# Patient Record
Sex: FEMALE | Race: WHITE | NOT HISPANIC OR LATINO | Employment: FULL TIME | URBAN - METROPOLITAN AREA
[De-identification: names, ages, dates, MRNs, and addresses within clinical notes are randomized per-mention and may not be internally consistent; named-entity substitution may affect disease eponyms.]

---

## 2021-09-08 ENCOUNTER — OFFICE VISIT (OUTPATIENT)
Dept: NEUROSURGERY | Facility: CLINIC | Age: 66
End: 2021-09-08

## 2021-09-08 VITALS
WEIGHT: 147.6 LBS | HEIGHT: 65 IN | BODY MASS INDEX: 24.59 KG/M2 | RESPIRATION RATE: 20 BRPM | SYSTOLIC BLOOD PRESSURE: 132 MMHG | HEART RATE: 96 BPM | DIASTOLIC BLOOD PRESSURE: 78 MMHG

## 2021-09-08 DIAGNOSIS — M54.41 CHRONIC RIGHT-SIDED LOW BACK PAIN WITH RIGHT-SIDED SCIATICA: ICD-10-CM

## 2021-09-08 DIAGNOSIS — M54.17 LUMBOSACRAL RADICULOPATHY AT L5: Primary | ICD-10-CM

## 2021-09-08 DIAGNOSIS — G89.29 CHRONIC RIGHT-SIDED LOW BACK PAIN WITH RIGHT-SIDED SCIATICA: ICD-10-CM

## 2021-09-08 DIAGNOSIS — I10 ESSENTIAL HYPERTENSION: ICD-10-CM

## 2021-09-08 DIAGNOSIS — E78.5 DYSLIPIDEMIA: ICD-10-CM

## 2021-09-08 PROCEDURE — 99204 OFFICE O/P NEW MOD 45 MIN: CPT | Performed by: NEUROLOGICAL SURGERY

## 2021-09-08 RX ORDER — GEMFIBROZIL 600 MG/1
600 TABLET, FILM COATED ORAL 2 TIMES DAILY
COMMUNITY
Start: 2021-08-08

## 2021-09-08 RX ORDER — ALENDRONATE SODIUM 70 MG/1
70 TABLET ORAL WEEKLY
COMMUNITY
Start: 2021-08-09

## 2021-09-08 RX ORDER — GABAPENTIN 300 MG/1
300 CAPSULE ORAL 2 TIMES DAILY
COMMUNITY
Start: 2021-08-08 | End: 2021-10-15

## 2021-09-08 RX ORDER — LEVOTHYROXINE SODIUM 0.1 MG/1
100 TABLET ORAL DAILY
COMMUNITY
Start: 2021-04-21

## 2021-09-08 RX ORDER — ATENOLOL 50 MG/1
50 TABLET ORAL DAILY
COMMUNITY
Start: 2021-07-09

## 2021-09-08 RX ORDER — GABAPENTIN 300 MG/1
300 CAPSULE ORAL 3 TIMES DAILY
Qty: 90 CAPSULE | Refills: 0 | Status: SHIPPED | OUTPATIENT
Start: 2021-09-08 | End: 2021-10-12

## 2021-09-08 NOTE — PROGRESS NOTES
Subjective     Chief Complaint: Back pain, right leg pain    Patient ID: Jen Farias is a 66 y.o. female seen for consultation today at the request of  Hanh Colón MD    History of Present Illness    This is a 66-year-old woman who presents to my office with chief complaints of an approximately 3-month history of low back pain and right leg pain.  She has been undergoing physical therapy and states that this does help somewhat, however her leg pain is constant and is affecting her quality of life.  She is endorsing radiating pain in an L5 distribution on the right side.  She does not have much in the way of medical comorbidities other than some dyslipidemia and hypertension.    The following portions of the patient's history were reviewed and updated as appropriate: allergies, current medications, past family history, past medical history, past social history, past surgical history and problem list.    Family history:   Family History   Problem Relation Age of Onset   • Cancer Mother    • Hypertension Father        Social history:   Social History     Socioeconomic History   • Marital status:      Spouse name: Not on file   • Number of children: Not on file   • Years of education: Not on file   • Highest education level: Not on file   Tobacco Use   • Smoking status: Never Smoker   • Smokeless tobacco: Never Used   Vaping Use   • Vaping Use: Never used   Substance and Sexual Activity   • Alcohol use: Never   • Drug use: Never   • Sexual activity: Defer       Review of Systems   Constitutional: Negative for activity change, appetite change, chills, diaphoresis, fatigue, fever and unexpected weight change.   HENT: Positive for postnasal drip. Negative for congestion, dental problem, drooling, ear discharge, ear pain, facial swelling, hearing loss, mouth sores, nosebleeds, rhinorrhea, sinus pressure, sinus pain, sneezing, sore throat, tinnitus, trouble swallowing and voice change.    Eyes: Negative for  "photophobia, pain, discharge, redness, itching and visual disturbance.   Respiratory: Negative for apnea, cough, choking, chest tightness, shortness of breath, wheezing and stridor.    Cardiovascular: Negative for chest pain, palpitations and leg swelling.   Gastrointestinal: Negative for abdominal distention, abdominal pain, anal bleeding, blood in stool, constipation, diarrhea, nausea, rectal pain and vomiting.   Endocrine: Negative for cold intolerance, heat intolerance, polydipsia, polyphagia and polyuria.   Genitourinary: Negative for decreased urine volume, difficulty urinating, dysuria, enuresis, flank pain, frequency, genital sores, hematuria and urgency.   Musculoskeletal: Positive for back pain. Negative for arthralgias, gait problem, joint swelling, myalgias, neck pain and neck stiffness.   Skin: Negative for color change, pallor, rash and wound.   Allergic/Immunologic: Negative for environmental allergies, food allergies and immunocompromised state.   Neurological: Negative for dizziness, tremors, seizures, syncope, facial asymmetry, speech difficulty, weakness, light-headedness, numbness and headaches.   Hematological: Negative for adenopathy. Does not bruise/bleed easily.   Psychiatric/Behavioral: Negative for agitation, behavioral problems, confusion, decreased concentration, dysphoric mood, hallucinations, self-injury, sleep disturbance and suicidal ideas. The patient is not nervous/anxious and is not hyperactive.        Objective   Blood pressure 132/78, pulse 96, resp. rate 20, height 165.1 cm (65\"), weight 67 kg (147 lb 9.6 oz).  Body mass index is 24.56 kg/m².    Physical Exam  Vitals and nursing note reviewed.   Constitutional:       Appearance: She is well-developed. She is not toxic-appearing.   HENT:      Head: Normocephalic and atraumatic.      Right Ear: Hearing normal.      Left Ear: Hearing normal.      Nose: Nose normal.   Eyes:      General: Lids are normal.      Conjunctiva/sclera: " Conjunctivae normal.      Pupils: Pupils are equal, round, and reactive to light.   Neck:      Vascular: No JVD.   Cardiovascular:      Rate and Rhythm: Normal rate and regular rhythm.      Pulses:           Radial pulses are 2+ on the right side and 2+ on the left side.   Pulmonary:      Effort: Pulmonary effort is normal. No respiratory distress.      Breath sounds: No stridor. No wheezing.   Musculoskeletal:      Cervical back: Normal range of motion.   Skin:     General: Skin is warm and dry.      Findings: No erythema or rash.   Neurological:      Mental Status: She is alert and oriented to person, place, and time.      GCS: GCS eye subscore is 4. GCS verbal subscore is 5. GCS motor subscore is 6.      Cranial Nerves: No cranial nerve deficit.      Motor: No abnormal muscle tone.      Deep Tendon Reflexes: Reflexes are normal and symmetric. Reflexes normal.      Comments: Casual, toe raised, heel raised, tandem gait all performed unremarkably.  Station is intact.    Positive straight leg raise test on the right.   Psychiatric:         Behavior: Behavior normal.         Thought Content: Thought content normal.         Judgment: Judgment normal.         Assessment/Plan     Independent Review of Radiographic Studies:      Available for my review is a MRI of the lumbar spine which was performed on 7/26/2021.  This demonstrates advanced degenerative disc disease at L4-5 and L5-S1.  Lumbar lordosis is decreased.  There are early Modic endplate changes at L4-5.  There are annular tears at L4-5 and L5-S1.  Although the spinal canal is somewhat congenitally narrowed, I do not appreciate any obvious foci of significant central canal stenosis.  At L4-5 there is a lateralizing component of the disc bulge and potentially a mesial facet cyst eccentric to the right side causing significant lateral recess stenosis on the right.  This could potentially be contributing to a right L5 radiculopathy.  I do not appreciate any  significant intraforaminal stenosis at L4-5 or L5-S1.  There is no significant lateral recess stenosis at L5-S1.  Incidental notation is made of several sacral Tarlov cysts which are of no clinical significance.    Medical Decision Making:      This is a 66-year-old woman with right L5 radiculopathy and chronic low back pain.  She is a candidate for a right L4-5 lateral recess decompression.  I did discuss the risks and benefits of this operation with her and I reviewed the pertinent anatomy with the aid of a 3-dimensional model of the spine.  She would like to try some conservative treatment which I think is entirely appropriate at this point given that she does not have any weakness.  I will increase her dose of gabapentin and schedule her for a right-sided L4-5 epidural steroid injection.  I would like to follow-up with her in about 6 weeks.    Diagnoses and all orders for this visit:    1. Lumbosacral radiculopathy at L5 (Primary)  -     gabapentin (NEURONTIN) 300 MG capsule; Take 1 capsule by mouth 3 (Three) Times a Day.  Dispense: 90 capsule; Refill: 0  -     Ambulatory Referral to Pain Management    2. Chronic right-sided low back pain with right-sided sciatica  -     gabapentin (NEURONTIN) 300 MG capsule; Take 1 capsule by mouth 3 (Three) Times a Day.  Dispense: 90 capsule; Refill: 0  -     Ambulatory Referral to Pain Management    3. Dyslipidemia    4. Essential hypertension        No follow-ups on file.           This document signed by EUNICE Wooten MD September 8, 2021 10:13 EDT

## 2021-10-01 NOTE — PROGRESS NOTES
"Chief Complaint: \"The pain in my lower back and right leg has improved.\"          History of Present Illness:   Patient: Ms. Jen Farias, 66 y.o. female   Referring Physician: Dr. EUNICE Wooten  Reason for Referral: Consultation for chronic intractable lower back pain.   Pain History:  Patient reports an approximately 6-month history of progressive lower back and right lower extremity pain, which began without incident.  Pain radiates from the lower back into the right lower extremity with an L5 dermatomal type distribution.  MRI of the lumbar spine on July 26, 2021 revealed advanced degenerative disc disease at L4-L5, L5-S1.  Modic endplate changes at L4-L5 and annular tears at L4-L5 and L5-S1.  At L4-L5 there is a disc bulge lateralizing to the right contributing to significant right lateral recess stenosis and potentially contributing to right L5 radiculopathy. Patient has failed to obtain pain relief with conservative measures including oral analgesics, physical therapy, to name a few. Jen Farias underwent neurosurgical consultation with Dr. EUNICE Wooten on September 8, 2021, and was found to be a potential surgical candidate for lumbar decompression. Pain has progressed in intensity over the past months, although she reports some improvement as of recently.   Pain Description: Constant pain with intermittent exacerbation, described as aching, burning, shooting and throbbing sensation.   Radiation of Pain: The pain radiates from the lumbar region down into the right lower extremity with a predominant L4-L5 dermatomal distribution   Pain intensity today: 2/10  Average pain intensity last week: 2/10  Pain intensity ranges from: 1/10 to 7/10  Aggravating factors: Pain increases with bending, twisting, standing, walking.   Alleviating factors: Pain decreases with sitting down, lying down   Associated Symptoms:   Patient denies numbness or weakness in the lower extremities.   Patient denies any new " bladder or bowel problems.   Patient reports difficulties with her balance but denies recent falls.     Review of previous therapies and additional medical records:  Jen Farias has already failed the following measures, including:   Conservative Measures: Oral analgesics, topical analgesics, ice, heat, physical therapy   Interventional Measures: None  Surgical Measures: No history of previous lumbar spine or hip surgery   Jen Farias underwent neurosurgical consultation with Dr. EUNICE Wooten on September 8, 2021, and was found to be a potential surgical candidate for lumbar decompression.   Jen Farias presents with significant comorbidities including hypertension, hyperlipidemia, osteopenia  In terms of current analgesics, Jen Farias takes: Gabapentin.  Patient also takes Fosamax  I have reviewed [facility] Report #[id number] (gabapentin) consistent with medication reconciliation.  SOAPP: Low Risk     Global Pain Scale 10-12  2021          Pain 8          Feelings 1          Clinical outcomes 3          Activities 4          GPS Total: 16            Review of Diagnostic Studies:    MRI of the lumbar spine without contrast 7/26/2021.  I have reviewed the images with the patient as well as the report. Vertebral body heights and alignment are preserved.  Diffuse spondylosis.  The conus medullaris is seen at L1-L2 and has unremarkable appearance.  Axial imaging:  L1-L2: Disc bulge. No significant canal or foraminal stenosis.  Perineural cyst in the right neuroforamen  L2-L3: Disc bulge. No significant canal or neuroforaminal stenosis  L3-L4: Disc bulge. Facet arthropathy.  No significant canal or foraminal stenosis  L4-L5: Broad-based disc protrusion with extension the right lateral recess.  Facet hypertrophy.  There is a stenosis of the right lateral recess.  No significant canal stenosis.  Mild bilateral foraminal stenosis  L5-S1: Broad-based disc protrusion.  No significant canal stenosis.  Mild foraminal  "stenosis    Review of Systems   HENT: Positive for sinus pressure.    Musculoskeletal: Positive for back pain.   All other systems reviewed and are negative.        Patient Active Problem List   Diagnosis   • Essential hypertension   • Dyslipidemia   • Lumbosacral radiculopathy   • Stenosis of lateral recess of lumbar spine   • Degeneration of lumbar or lumbosacral intervertebral disc       Past Medical History:   Diagnosis Date   • Hypertension    • Low back pain          History reviewed. No pertinent surgical history.      Family History   Problem Relation Age of Onset   • Cancer Mother    • Hypertension Father          Social History     Socioeconomic History   • Marital status:    Tobacco Use   • Smoking status: Never Smoker   • Smokeless tobacco: Never Used   Vaping Use   • Vaping Use: Never used   Substance and Sexual Activity   • Alcohol use: Never   • Drug use: Never   • Sexual activity: Defer          Current Outpatient Medications:   •  alendronate (FOSAMAX) 70 MG tablet, Take 70 mg by mouth 1 (One) Time Per Week., Disp: , Rfl:   •  atenolol (TENORMIN) 50 MG tablet, Take 50 mg by mouth Daily., Disp: , Rfl:   •  Calcium Carbonate (Caltrate 600) 1500 (600 Ca) MG tablet, Take 600 mg by mouth Daily., Disp: , Rfl:   •  gabapentin (NEURONTIN) 300 MG capsule, Take 300 mg by mouth 2 (Two) Times a Day., Disp: , Rfl:   •  gemfibrozil (LOPID) 600 MG tablet, Take 600 mg by mouth 2 (Two) Times a Day., Disp: , Rfl:   •  levothyroxine (SYNTHROID, LEVOTHROID) 100 MCG tablet, Take 100 mcg by mouth Daily., Disp: , Rfl:       Allergies   Allergen Reactions   • Doxycycline Hyclate Itching   • Quinolones Itching         /80   Pulse 67   Ht 165.1 cm (65\")   Wt 67.1 kg (148 lb)   SpO2 98%   BMI 24.63 kg/m²       Physical Exam:  Constitutional: Patient appears well-developed, well-nourished, well-hydrated, appears younger than stated age  HEENT: Head: Normocephalic and atraumatic  Eyes: Conjunctivae and lids " are normal  Pupils: Equal, round, reactive to light  Neck: Trachea normal. Neck supple. No JVD present.   Peripheral vascular exam: Posterior tibialis: right 2+ and left 2+. Dorsalis pedis: right 2+ and left 2+. No edema.   Musculoskeletal   Gait and station: Gait evaluation demonstrated a normal gait. Able to walk on heels, toes, tandem walking   Lumbar spine: Passive and active range of motion are full and without pain. Extension, flexion, lateral flexion, rotation of the lumbar spine did not increase or reproduce pain. Lumbar facet joint loading maneuvers are negative.   Austin test and Gaenslen's test are negative   Piriformis maneuvers are negative   Hip joints: The range of motion of the hip joints is almost full and without pain   Neurological:   Patient is alert and oriented to person, place, and time.   Speech: Normal.   Cortical function: Normal mental status.   Reflex Scores:  Right patellar: 2+  Left patellar: 2+  Right Achilles: 1+  Left Achilles: 1+  Motor strength: 5/5  Motor Tone: Normal   Involuntary movements: None.   Superficial/Primitive Reflexes: Primitive reflexes were absent.   Right Heaton: Absent  Left Heaton: Absent  Right ankle clonus: Absent  Left ankle clonus: Absent   Babinsky: Absent  Long tract signs: Negative. Straight leg raising test: Negative.   Sensory exam: Intact to light touch, intact pain and temperature sensation, intact vibration sensation and normal proprioception.   Coordination: Normal finger to nose and heel to shin. Normal balance and negative Romberg's sign   Skin and subcutaneous tissue: Skin is warm and intact. No rash noted. No cyanosis.   Psychiatric: Judgment and insight: Normal. Recent and remote memory: Intact. Mood and affect: Normal.     ASSESSMENT:   1. Lumbosacral radiculopathy    2. Degeneration of lumbar or lumbosacral intervertebral disc    3. Stenosis of lateral recess of lumbar spine        PLAN/MEDICAL DECISION MAKING:  Patient reports a 6-month  history of progressive lower back and right lower extremity pain, which began without incident. Pain radiates from the lower back into the right lower extremity with an L5 dermatomal distribution. MRI of the lumbar spine on July 26, 2021 revealed advanced degenerative disc disease at L4-L5, L5-S1.  Modic endplate changes at L4-L5 and annular tears at L4-L5 and L5-S1.  At L4-L5 there is a disc bulge lateralizing to the right contributing to significant right lateral recess stenosis and potentially contributing to right L5 radiculopathy. Patient failed to obtain pain relief with conservative measures including oral analgesics, physical therapy, to name a few. Jen Farias underwent neurosurgical consultation with Dr. EUNICE Wooten on September 8, 2021, and was found to be a potential surgical candidate for lumbar decompression.  Patient was referred for interventional pain management measures.  However, she reports significant improvement of her low back and right lower extremity pain for the past weeks.  I have reviewed all available patient's medical records as well as previous therapies as referenced above. I had a lengthy conversation with Ms. Jen Farias regarding her chronic pain condition and potential therapeutic options including risks, benefits, alternative therapies, to name a few. Therefore, I have proposed the following plan:  1. Interventional pain management measures: Follow-up with LENCHO Amin on an as-needed basis.  I [facility] the [facility] If pain increases, we may proceed with diagnostic and therapeutic right L4-L5 transforaminal epidural steroid injection. We may repeat epidural depending on patient's outcome.  Patient will follow-up with Dr. Wooten thereafter for possible L4-L5 lumbar decompression.  2. Diagnostic studies: None indicated at this time.  We may consider EMG/NCV of the lower extremities if pain recurs  3. Pharmacological measures:  Reviewed and discussed; Patient takes gabapentin  4. Long-term rehabilitation efforts:  A. The patient does not have a history of falls. I did complete a risk assessment for falls  B. Continue a comprehensive physical therapy program for core strengthening, gait and balance training and neurodynamics   C. Start an exercise program such as water therapy and swimming  D. Contrast therapy: Apply ice-packs for 15-20 minutes, followed by heating pads for 15-20 minutes to affected area   5. The patient has been instructed to contact my office with any questions or difficulties. The patient understands the plan and agrees to proceed accordingly.      Patient Care Team:  Hanh Colón MD as PCP - General (Internal Medicine)  Hanh Colón MD as Referring Physician (Internal Medicine)     No orders of the defined types were placed in this encounter.        No future appointments.      Jesus Rogers MD     EMR Dragon/Transcription disclaimer:  Much of this encounter note is an electronic transcription of spoken language to printed text. Electronic transcription of spoken language may permit erroneous, or at times, nonsensical words or phrases to be inadvertently transcribed. Although I have reviewed the note for such errors, some may still exist.

## 2021-10-08 PROBLEM — M54.17 LUMBOSACRAL RADICULOPATHY: Status: ACTIVE | Noted: 2021-10-08

## 2021-10-08 PROBLEM — M51.37 DEGENERATION OF LUMBAR OR LUMBOSACRAL INTERVERTEBRAL DISC: Status: ACTIVE | Noted: 2021-10-08

## 2021-10-08 PROBLEM — M51.379 DEGENERATION OF LUMBAR OR LUMBOSACRAL INTERVERTEBRAL DISC: Status: ACTIVE | Noted: 2021-10-08

## 2021-10-08 PROBLEM — M48.061 STENOSIS OF LATERAL RECESS OF LUMBAR SPINE: Status: ACTIVE | Noted: 2021-10-08

## 2021-10-12 ENCOUNTER — OFFICE VISIT (OUTPATIENT)
Dept: PAIN MEDICINE | Facility: CLINIC | Age: 66
End: 2021-10-12

## 2021-10-12 VITALS
SYSTOLIC BLOOD PRESSURE: 134 MMHG | HEART RATE: 67 BPM | WEIGHT: 148 LBS | HEIGHT: 65 IN | OXYGEN SATURATION: 98 % | BODY MASS INDEX: 24.66 KG/M2 | DIASTOLIC BLOOD PRESSURE: 80 MMHG

## 2021-10-12 DIAGNOSIS — M48.061 STENOSIS OF LATERAL RECESS OF LUMBAR SPINE: ICD-10-CM

## 2021-10-12 DIAGNOSIS — M54.17 LUMBOSACRAL RADICULOPATHY: ICD-10-CM

## 2021-10-12 DIAGNOSIS — M51.37 DEGENERATION OF LUMBAR OR LUMBOSACRAL INTERVERTEBRAL DISC: ICD-10-CM

## 2021-10-12 PROCEDURE — 99204 OFFICE O/P NEW MOD 45 MIN: CPT | Performed by: ANESTHESIOLOGY

## 2021-10-15 RX ORDER — GABAPENTIN 300 MG/1
CAPSULE ORAL
Qty: 90 CAPSULE | Refills: 1 | Status: SHIPPED | OUTPATIENT
Start: 2021-10-15 | End: 2022-02-22

## 2021-10-15 NOTE — TELEPHONE ENCOUNTER
Provider:  Elgin  Caller:  Automated refill request  Surgery:  NA  Surgery Date: NA   Last visit:  09/08/2021  Next visit: NA    Reason for call:    Automated refill request for Gabapentin.         Requested Prescriptions     Pending Prescriptions Disp Refills   • gabapentin (NEURONTIN) 300 MG capsule [Pharmacy Med Name: GABAPENTIN 300MG CAPSULES] 90 capsule      Sig: TAKE 1 CAPSULE BY MOUTH THREE TIMES DAILY     NAV:    07/02/2021 Gabapentin 300MG 1957 60 30 [facility] 1  08/08/2021 Gabapentin 300MG 1957 60 30 BARRINGTON VARGAS [facility] 1  09/08/2021 Gabapentin 300MG 1957 90 30 ELGIN [facility] 1

## 2022-02-22 NOTE — TELEPHONE ENCOUNTER
Provider:  Je  Caller: patient  Time of call:     Phone #:[phone]    Surgery:  no  Surgery Date:    Last visit:   09/08/21  Next visit:     NAV:   07/02/2021 Gabapentin 300MG 1957 60 30 Hanh Colón [facility] 1  08/08/2021 Gabapentin 300MG 1957 60 30 Hanh Colón [facility] 1  09/08/2021 Gabapentin 300MG 1957 90 30 Sunny Wooten [employer] [facility] 1  10/15/2021 Gabapentin 300MG 1957 90 30 [place], [place] [employer] [facility] 1  12/05/2021 Gabapentin 300MG 1957 90 30 [place], [place] [employer] [facility] 1      Reason for call:     Patient requests refill on Gabapentin.

## 2022-02-23 RX ORDER — GABAPENTIN 300 MG/1
CAPSULE ORAL
Qty: 90 CAPSULE | Refills: 1 | Status: SHIPPED | OUTPATIENT
Start: 2022-02-23 | End: 2022-06-06

## 2022-05-22 PROCEDURE — U0004 COV-19 TEST NON-CDC HGH THRU: HCPCS | Performed by: NURSE PRACTITIONER

## 2022-05-24 ENCOUNTER — TELEPHONE (OUTPATIENT)
Dept: URGENT CARE | Facility: CLINIC | Age: 67
End: 2022-05-24

## 2022-06-05 DIAGNOSIS — M54.17 LUMBOSACRAL RADICULOPATHY: Primary | ICD-10-CM

## 2022-06-06 RX ORDER — GABAPENTIN 300 MG/1
CAPSULE ORAL
Qty: 90 CAPSULE | Refills: 0 | Status: SHIPPED | OUTPATIENT
Start: 2022-06-06 | End: 2022-07-22 | Stop reason: SDUPTHER

## 2022-06-06 NOTE — TELEPHONE ENCOUNTER
Provider:  Je  Caller:  Automated refill request  Surgery:  N/A  Surgery Date:  N/A  Last visit:  Office Visit with Sunny Wooten MD (09/08/2021)    Next visit: TBD    Reason for call:  Automated request    Silvano: 12/05/2021 Gabapentin 300MG 1957 90 30 Mariya Hayes [facility] 1  02/23/2022 Gabapentin 300MG 1957 90 30 Anand Medina [facility] 1  04/14/2022 Gabapentin 300MG 1957 90 30 Anand Medina [facility] 1    Requested Prescriptions     Pending Prescriptions Disp Refills   • gabapentin (NEURONTIN) 300 MG capsule [Pharmacy Med Name: GABAPENTIN 300MG CAPSULES] 90 capsule      Sig: TAKE 1 CAPSULE BY MOUTH THREE TIMES DAILY

## 2022-07-22 DIAGNOSIS — M54.17 LUMBOSACRAL RADICULOPATHY: ICD-10-CM

## 2022-07-22 RX ORDER — GABAPENTIN 300 MG/1
300 CAPSULE ORAL 3 TIMES DAILY
Qty: 90 CAPSULE | Refills: 0 | Status: SHIPPED | OUTPATIENT
Start: 2022-07-22 | End: 2022-09-02

## 2022-07-22 NOTE — TELEPHONE ENCOUNTER
Provider:  Betty Marie  Caller:  Automated refill request  Surgery:  -  Surgery Date: -   Last visit: Office Visit with Sunny Wooten MD (09/08/2021)     Next visit:     Reason for call:         Requested Prescriptions     Pending Prescriptions Disp Refills   • gabapentin (NEURONTIN) 300 MG capsule 90 capsule 0     Sig: Take 1 capsule by mouth 3 (Three) Times a Day.     08/08/2021 Gabapentin 300MG 1957 60 30 Hanh Colón [place] [employer] 1  09/08/2021 Gabapentin 300MG 1957 90 30 Sunny Wooten [place] [employer] 1  10/15/2021 Gabapentin 300MG 1957 90 30 Mariya Hayes [place] [employer] 1  12/05/2021 Gabapentin 300MG 1957 90 30 [facility] [employer] 1  02/23/2022 Gabapentin 300MG 1957 90 30 Anand Medina [place] [employer] 1  04/14/2022 Gabapentin 300MG 1957 90 30 Anand Medina [place] [employer] 1  06/06/2022 Gabapentin 300MG 1957 90 30 Betty Marie [facility] 1

## 2022-09-02 DIAGNOSIS — M54.17 LUMBOSACRAL RADICULOPATHY: ICD-10-CM

## 2022-09-02 RX ORDER — GABAPENTIN 300 MG/1
CAPSULE ORAL
Qty: 90 CAPSULE | Refills: 0 | Status: SHIPPED | OUTPATIENT
Start: 2022-09-02

## 2022-09-02 NOTE — TELEPHONE ENCOUNTER
Patient needs a follow up if we are continuing to prescribe this medication. It has been a year, we need to see how she is doing. It can be with a PA.  If she is doing well and wants to follow up with PCP instead, that is ok, and we would release this medication to them to renew.

## 2022-09-02 NOTE — TELEPHONE ENCOUNTER
Provider:  Betty Marie  Caller:  Automated refill request  Surgery:  -  Surgery Date: -   Last visit: Office Visit with Sunny Wooten MD (09/08/2021)     Next visit:     Requested Prescriptions     Pending Prescriptions Disp Refills   • gabapentin (NEURONTIN) 300 MG capsule [Pharmacy Med Name: GABAPENTIN 300MG CAPSULES] 90 capsule      Sig: TAKE 1 CAPSULE BY MOUTH THREE TIMES DAILY       04/14/2022 Gabapentin 300MG 1957 90 30 Anand Medina [facility] 1  06/06/2022 Gabapentin 300MG 1957 90 30 Betty Marie [facility] [place] 1  07/22/2022 Gabapentin 300MG 1957 90 30 Anand Medina [facility] 1

## 2022-10-26 DIAGNOSIS — M54.17 LUMBOSACRAL RADICULOPATHY: ICD-10-CM

## 2022-10-27 RX ORDER — GABAPENTIN 300 MG/1
CAPSULE ORAL
Qty: 90 CAPSULE | OUTPATIENT
Start: 2022-10-27

## 2022-10-27 NOTE — TELEPHONE ENCOUNTER
Last documentation on last refill on 9/2/22 from GUILLE Hayes  Patient needs a follow up if we are continuing to prescribe this medication. It has been a year, we need to see how she is doing. It can be with a PA.  If she is doing well and wants to follow up with PCP instead, that is ok, and we would release this medication to them to renew.     Called to relay message and refills to come from PCP if she isn't going to follow up in office.

## 2024-04-10 ENCOUNTER — OFFICE VISIT (OUTPATIENT)
Age: 69
End: 2024-04-10
Payer: OTHER MISCELLANEOUS

## 2024-04-10 VITALS
HEIGHT: 64 IN | DIASTOLIC BLOOD PRESSURE: 84 MMHG | BODY MASS INDEX: 22.01 KG/M2 | WEIGHT: 128.9 LBS | SYSTOLIC BLOOD PRESSURE: 146 MMHG

## 2024-04-10 DIAGNOSIS — M25.561 RIGHT KNEE PAIN, UNSPECIFIED CHRONICITY: Primary | ICD-10-CM

## 2024-04-10 DIAGNOSIS — S83.281A ACUTE LATERAL MENISCUS TEAR OF RIGHT KNEE, INITIAL ENCOUNTER: ICD-10-CM

## 2024-04-10 RX ORDER — BUPIVACAINE HYDROCHLORIDE 5 MG/ML
4 INJECTION, SOLUTION EPIDURAL; INTRACAUDAL
Status: COMPLETED | OUTPATIENT
Start: 2024-04-10 | End: 2024-04-10

## 2024-04-10 RX ORDER — LOSARTAN POTASSIUM 25 MG/1
1 TABLET ORAL DAILY
COMMUNITY
Start: 2024-01-29

## 2024-04-10 RX ORDER — LIDOCAINE HYDROCHLORIDE 10 MG/ML
4 INJECTION, SOLUTION EPIDURAL; INFILTRATION; INTRACAUDAL; PERINEURAL
Status: COMPLETED | OUTPATIENT
Start: 2024-04-10 | End: 2024-04-10

## 2024-04-10 RX ORDER — TRIAMCINOLONE ACETONIDE 40 MG/ML
80 INJECTION, SUSPENSION INTRA-ARTICULAR; INTRAMUSCULAR
Status: COMPLETED | OUTPATIENT
Start: 2024-04-10 | End: 2024-04-10

## 2024-04-10 RX ADMIN — BUPIVACAINE HYDROCHLORIDE 4 ML: 5 INJECTION, SOLUTION EPIDURAL; INTRACAUDAL at 15:29

## 2024-04-10 RX ADMIN — LIDOCAINE HYDROCHLORIDE 4 ML: 10 INJECTION, SOLUTION EPIDURAL; INFILTRATION; INTRACAUDAL; PERINEURAL at 15:29

## 2024-04-10 RX ADMIN — TRIAMCINOLONE ACETONIDE 80 MG: 40 INJECTION, SUSPENSION INTRA-ARTICULAR; INTRAMUSCULAR at 15:29

## 2024-04-10 NOTE — PROGRESS NOTES
Procedure   - Large Joint Arthrocentesis: R knee on 4/10/2024 3:29 PM  Indications: pain  Details: 21 G needle, ultrasound-guided superolateral approach  Medications: 80 mg triamcinolone acetonide 40 MG/ML; 4 mL lidocaine PF 1% 1 %; 4 mL bupivacaine (PF) 0.5 %  Outcome: tolerated well, no immediate complications  Procedure, treatment alternatives, risks and benefits explained, specific risks discussed. Consent was given by the patient. Immediately prior to procedure a time out was called to verify the correct patient, procedure, equipment, support staff and site/side marked as required. Patient was prepped and draped in the usual sterile fashion.

## 2024-04-10 NOTE — PROGRESS NOTES
[facility] [place] [facility] Office Visit     Office Visit       Date: 04/10/2024   Patient Name: Jen Farias  MRN: [id number]  YOB: 1955    Referring Physician: Gamaliel Enamorado MD     Chief Complaint:   Chief Complaint   Patient presents with    Right Knee - Pain       History of Present Illness:   Jen Farias is a 69 y.o. female who presents with right knee pain for 1 year(s). Onset mechanical fall. Pain is localized to the lateral joint line and is a 8-9/10 on the pain scale. Pain is described as stabbing and shooting. Associated symptoms include pain, swelling, and stiffness. The pain is worse with climbing stairs, sleeping, and rising from seated position; nothing make it better. Previous treatments have included: physical therapy since symptom onset. Although some transient relief was reported with these interventions, these conservative measures have failed and symptoms have persisted. The patient is limited in daily activities and has had a significant decrease in quality of life as a result. She denies fevers, chills, or constitutional symptoms.    Subjective   Review of Systems: Review of Systems   Constitutional:  Negative for chills, fever, unexpected weight gain and unexpected weight loss.   HENT:  Negative for congestion, postnasal drip and rhinorrhea.    Eyes:  Negative for blurred vision.   Respiratory:  Negative for shortness of breath.    Cardiovascular:  Negative for leg swelling.   Gastrointestinal:  Negative for abdominal pain, nausea and vomiting.   Genitourinary:  Negative for difficulty urinating.   Musculoskeletal:  Positive for arthralgias. Negative for gait problem, joint swelling and myalgias.   Skin:  Negative for skin lesions and wound.   Neurological:  Negative for dizziness, weakness, light-headedness and numbness.   Hematological:  Does not bruise/bleed easily.   Psychiatric/Behavioral:  Negative for depressed mood.         I  have reviewed the following portions of the patient's history:History of Present Illness and review of systems.    Past Medical History:   Past Medical History:   Diagnosis Date    Hypertension     Low back pain     Sciatica        Past Surgical History: History reviewed. No pertinent surgical history.    Family History:   Family History   Problem Relation Age of Onset    Cancer Mother     Hypertension Father        Social History:   Social History     Socioeconomic History    Marital status:    Tobacco Use    Smoking status: Never    Smokeless tobacco: Never   Vaping Use    Vaping status: Never Used   Substance and Sexual Activity    Alcohol use: Never    Drug use: Never    Sexual activity: Defer       Medications:   Current Outpatient Medications:     ASPIRIN 81 PO, aspirin, Disp: , Rfl:     atenolol (TENORMIN) 50 MG tablet, Take 1 tablet by mouth Daily., Disp: , Rfl:     Calcium Carbonate (Caltrate 600) 1500 (600 Ca) MG tablet, Take 1 tablet by mouth Daily., Disp: , Rfl:     gabapentin (NEURONTIN) 300 MG capsule, TAKE 1 CAPSULE BY MOUTH THREE TIMES DAILY, Disp: 90 capsule, Rfl: 0    gemfibrozil (LOPID) 600 MG tablet, Take 1 tablet by mouth 2 (Two) Times a Day., Disp: , Rfl:     levothyroxine (SYNTHROID, LEVOTHROID) 100 MCG tablet, Take 1 tablet by mouth Daily., Disp: , Rfl:     losartan (COZAAR) 25 MG tablet, Take 1 tablet by mouth Daily., Disp: , Rfl:     NON FORMULARY, BI-EST E3E2 (8020) CREAM # 0.5MG/GM VAGINAL  [facility], Disp: , Rfl:     SUMAtriptan (IMITREX) 100 MG tablet, sumatriptan 100 mg tablet  take 1 po as needed for migraine, may repeat in 2 hours if needed, Disp: , Rfl:     benzonatate (TESSALON) 200 MG capsule, Take 1 capsule by mouth 3 (Three) Times a Day As Needed for Cough., Disp: 30 capsule, Rfl: 0    Allergies:   Allergies   Allergen Reactions    Doxycycline Hyclate Itching    Quinolones Itching       I reviewed the patient's chief complaint, history of present  "illness, review of systems, past medical history, surgical history, family history, social history, medications and allergy list.     Objective    Vital Signs:   Vitals:    04/10/24 1433   BP: 146/84   Weight: 58.5 kg (128 lb 14.4 oz)   Height: 162.6 cm (64\")     Body mass index is 22.13 kg/m².   BMI is within normal parameters. No other follow-up for BMI required.     Patient reports that she is a non-smoker and has not ever been a smoker.  This behavior was applauded and she was encouraged to continue in smoking cessation.  We will continue to monitor at subsequent visits.    Ortho Exam:  right knee exam:   knee contour shows mild swelling present.   Active range of motion 0° to 120°.   Passive range of motion 0° to 130°.   Negative varus or valgus instability.   Negative anterior or posterior laxity.   Negative Lachman exam.   negative tenderness to palpation at the medial joint line.   Positive tenderness at the lateral joint line.   mild tenderness over plica band at the medial knee.   Negative patella instability or crepitus.   Sensation grossly intact to the lower extremity and toes.   positive lateral Kelly's test.  normal gait.   no assistive device   No limp.   Normal body habitus.  Awake alert and oriented ×3 no acute distress.   calf is supple and nontender negative Renaldo.   Dorsalis pedis 2+ bilaterally.   Sensation intact to light touch.    Results Review:   Imaging Results (Last 24 Hours)       Procedure Component Value Units Date/Time    US Guided Injection [[id number]] Resulted: 04/10/24 1532     Updated: 04/10/24 1532        I personally reviewed and interpreted radiographs of the right knee from 3/20/2024.  No acute fracture or dislocation.  Mild degenerative changes noted about the knee.        Procedures    Assessment / Plan    Assessment/Plan:   Diagnoses and all orders for this visit:    1. Right knee pain, unspecified chronicity (Primary)  -     US Guided Injection    2. Acute lateral " meniscus tear of right knee, initial encounter    Other orders  -     - Large Joint Arthrocentesis: R knee    Worker's Compensation case.    Right lateral knee injury that occurred when slipping on a wet floor.  Previous treatment has included multiple imaging studies, physical therapy, and Tylenol.  Still has significant pain especially with prolonged standing and at night.  Localizes pain along the lateral joint line.  She has mild degenerative changes on her radiographs.  Her MRI shows a anterior horn lateral meniscus tear with mild meniscus extrusion.  We explained these findings and how they correlate to her symptoms especially location of pain and effusion.  We discussed a comprehensive nonoperative treatment plan.    Plan:  1.  Inject the right knee with corticosteroid under ultrasound guidance for proper needle placement.  2.  Fit her for a Dry-Jarek hinged knee brace.  3.  Continue physical therapy.  4.  Work note provided with restrictions: For reduced hours to limit her standing.  5.  Follow-up in 6 weeks.    Previous imaging studies reviewed: 2/15/2024-MRI right knee.  3/20/2024-radiographs of the right knee.    Previous documentation reviewed: 4/3/2024-occupational medicine-Gamaliel Enamorado MD.    Follow Up:   Return in about 6 weeks (around 5/22/2024) for Recheck.      Hernan Harris MD  [facility] [place] [facility]

## 2024-04-11 DIAGNOSIS — S83.281A ACUTE LATERAL MENISCUS TEAR OF RIGHT KNEE, INITIAL ENCOUNTER: ICD-10-CM

## 2024-04-11 DIAGNOSIS — M25.561 RIGHT KNEE PAIN, UNSPECIFIED CHRONICITY: Primary | ICD-10-CM

## 2024-04-18 ENCOUNTER — TELEPHONE (OUTPATIENT)
Dept: ORTHOPEDIC SURGERY | Facility: CLINIC | Age: 69
End: 2024-04-18

## 2024-04-18 NOTE — TELEPHONE ENCOUNTER
"TEODORO FROM [facility] CALLED BACK      TEODORO STATED SHE SPOKE w/SOMEONE [facility] WHO SAID THE PT ORDER & WORK COMP INFO / PAPERWORK MAY NEED TO BE \"FIXED\" - THAT THE WOMAN TEODORO SPOKE w/\"SAID SHE'D WORK ON THE PAPERWORK & GET IT FAXED OVER\"    PATIENT STOPPED BY PHYSICAL THERAPY [facility] TODAY & WILL BE SCHEDULED FOR INITIAL RIGHT KNEE PT APPT ONCE ORDER & WORK COMP INFO RECEIVED     [facility] -[phone]     TEODORO STATED NO CALL BACK NEEDED WHEN ORDER & W/C INFO FAXED     THANKS   "

## 2024-04-18 NOTE — TELEPHONE ENCOUNTER
Caller: TEODORO    Relationship:  [facility]     Best call back number: [phone]    What form or medical record are you requesting:   ASKIGN FOR A PT ORDER AS WELL AS A COPY OF THE PATIENTS WORK COMP INFORMATION     How would you like to receive the form or medical records (pick-up, mail, fax): FAX  If fax, what is the fax number: [phone]

## 2024-04-23 ENCOUNTER — TELEPHONE (OUTPATIENT)
Dept: ORTHOPEDIC SURGERY | Facility: CLINIC | Age: 69
End: 2024-04-23
Payer: MEDICAID

## 2024-04-23 NOTE — TELEPHONE ENCOUNTER
TEODORO (PHONE:[phone]) WITH [facility] CALLED STATING THEY ARE NEEDING ORDER ALONG WITH WORKER'S COMP INFO TO BE FAXED OVER.    FAX: [phone]    PLEASE ADVISE

## 2024-04-24 ENCOUNTER — TELEPHONE (OUTPATIENT)
Dept: ORTHOPEDIC SURGERY | Facility: CLINIC | Age: 69
End: 2024-04-24
Payer: MEDICAID

## 2024-04-24 NOTE — TELEPHONE ENCOUNTER
Relationship: WORK COMP     Best call back number: [phone] (home)       What form or medical record are you requesting: MISSING PROGNOTES FROM 04/10/24     How would you like to receive the form or medical records (pick-up, mail, fax): FAX  If fax, what is the fax number: [phone]

## 2024-05-29 ENCOUNTER — OFFICE VISIT (OUTPATIENT)
Age: 69
End: 2024-05-29
Payer: OTHER MISCELLANEOUS

## 2024-05-29 VITALS
DIASTOLIC BLOOD PRESSURE: 92 MMHG | HEIGHT: 64 IN | BODY MASS INDEX: 22.24 KG/M2 | WEIGHT: 130.3 LBS | SYSTOLIC BLOOD PRESSURE: 130 MMHG

## 2024-05-29 DIAGNOSIS — S83.281D ACUTE LATERAL MENISCUS TEAR OF RIGHT KNEE, SUBSEQUENT ENCOUNTER: Primary | ICD-10-CM

## 2024-05-29 PROCEDURE — 99213 OFFICE O/P EST LOW 20 MIN: CPT | Performed by: STUDENT IN AN ORGANIZED HEALTH CARE EDUCATION/TRAINING PROGRAM

## 2024-05-29 NOTE — PROGRESS NOTES
Franky                                      [facility] [place] [facility] Office Follow Up Visit     Office Follow Up      Date: 05/29/2024   Patient Name: Jen Farias  MRN: [id number]  YOB: 1955    Referring Physician: No ref. provider found     Chief Complaint:   Chief Complaint   Patient presents with    Follow-up     7 week follow-up--Right knee pain       History of Present Illness: Jen Farias is a 69 y.o. female who is here today for follow up on right knee pain ongoing for the last 3 months.  At last visit, we injected the knee with corticosteroid.  Fitted for a right knee brace that she has always been wearing.  She is also been in physical therapy.  Much improvement with knee flexion.  She is recent been doing water therapy and this is also helped significantly.  We have had her at a reduced hours capacity at work.  Worker's Compensation case.    Subjective   Review of Systems: Review of Systems     Medications:   Current Outpatient Medications:     ASPIRIN 81 PO, aspirin, Disp: , Rfl:     atenolol (TENORMIN) 50 MG tablet, Take 1 tablet by mouth Daily., Disp: , Rfl:     benzonatate (TESSALON) 200 MG capsule, Take 1 capsule by mouth 3 (Three) Times a Day As Needed for Cough., Disp: 30 capsule, Rfl: 0    Calcium Carbonate (Caltrate 600) 1500 (600 Ca) MG tablet, Take 1 tablet by mouth Daily., Disp: , Rfl:     gabapentin (NEURONTIN) 300 MG capsule, TAKE 1 CAPSULE BY MOUTH THREE TIMES DAILY, Disp: 90 capsule, Rfl: 0    gemfibrozil (LOPID) 600 MG tablet, Take 1 tablet by mouth 2 (Two) Times a Day., Disp: , Rfl:     levothyroxine (SYNTHROID, LEVOTHROID) 100 MCG tablet, Take 1 tablet by mouth Daily., Disp: , Rfl:     losartan (COZAAR) 25 MG tablet, Take 1 tablet by mouth Daily., Disp: , Rfl:     NON FORMULARY, BI-EST E3E2 (8020) CREAM # 0.5MG/GM VAGINAL  [facility], Disp: , Rfl:     SUMAtriptan (IMITREX) 100 MG tablet, sumatriptan 100 mg tablet  take 1 po as needed for migraine, may  "repeat in 2 hours if needed, Disp: , Rfl:     Allergies:   Allergies   Allergen Reactions    Doxycycline Hyclate Itching    Quinolones Itching       I have reviewed and updated the patient's chief complaint, history of present illness, review of systems, past medical history, surgical history, family history, social history, medications and allergy list as appropriate.     Objective    Vital Signs:   Vitals:    05/29/24 1437   BP: 130/92   Weight: 59.1 kg (130 lb 4.8 oz)   Height: 161.5 cm (63.58\")     Body mass index is 22.66 kg/m².  BMI is within normal parameters. No other follow-up for BMI required.    Patient reports that she is a non-smoker and has not ever been a smoker.  This behavior was applauded and she was encouraged to continue in smoking cessation.  We will continue to monitor at subsequent visits.     Ortho Exam:  right knee exam:   knee contour shows mild swelling present.   Active range of motion 0° to 120°.   Passive range of motion 0° to 130°.   Pseudolaxity with valgus stress testing.  Negative anterior or posterior laxity.   Negative Lachman exam.   negative tenderness to palpation at the medial joint line.   Positive tenderness at the lateral joint line.   mild tenderness over plica band at the medial knee.   Negative patella instability or crepitus.   Sensation grossly intact to the lower extremity and toes.   positive lateral Kelly's test.  normal gait.   no assistive device     Results Review:   Imaging Results (Last 24 Hours)       ** No results found for the last 24 hours. **            Procedures    Assessment / Plan    Assessment/Plan:   Diagnoses and all orders for this visit:    1. Acute lateral meniscus tear of right knee, subsequent encounter (Primary)  -     Ambulatory Referral to Physical Therapy    Follow-up right knee injury from MRI confirmed lateral meniscus tear.  Symptomatic improvement with physical therapy particularly water therapy, reduced hour capacity and bracing.  " Still having significant pain however especially the more that she works.  Recommend continue with the reduced hour restriction, continue water therapy, and bracing.  I will prescribe an NSAID to be taken twice per day with diclofenac.  I will see her back in 8 weeks.    Follow Up:   Return in about 8 weeks (around 7/24/2024) for Recheck.      Hernan Harris MD  [place]

## 2024-08-12 ENCOUNTER — OFFICE VISIT (OUTPATIENT)
Age: 69
End: 2024-08-12
Payer: OTHER MISCELLANEOUS

## 2024-08-12 VITALS
DIASTOLIC BLOOD PRESSURE: 86 MMHG | BODY MASS INDEX: 21.87 KG/M2 | HEIGHT: 64 IN | SYSTOLIC BLOOD PRESSURE: 146 MMHG | WEIGHT: 128.1 LBS

## 2024-08-12 DIAGNOSIS — S83.281D ACUTE LATERAL MENISCUS TEAR OF RIGHT KNEE, SUBSEQUENT ENCOUNTER: Primary | ICD-10-CM

## 2024-08-12 PROCEDURE — 99213 OFFICE O/P EST LOW 20 MIN: CPT | Performed by: STUDENT IN AN ORGANIZED HEALTH CARE EDUCATION/TRAINING PROGRAM

## 2024-08-12 RX ORDER — FLUTICASONE PROPIONATE 50 MCG
SPRAY, SUSPENSION (ML) NASAL
COMMUNITY
Start: 2024-06-19

## 2024-08-12 RX ORDER — LOSARTAN POTASSIUM 50 MG/1
1 TABLET ORAL DAILY
COMMUNITY
Start: 2024-06-19

## 2024-08-12 NOTE — PROGRESS NOTES
[facility] [place] [facility] [place] Follow Up Visit     Office Follow Up      Date: 08/12/2024   Patient Name: Jen Farias  MRN: [id number]  YOB: 1955    Referring Physician: Hanh Colón MD     Chief Complaint:   Chief Complaint   Patient presents with   • Follow-up     2.5 month follow up;  Acute lateral meniscus tear of right knee       History of Present Illness: Jen Farias is a 69 y.o. female who returns to clinic today for follow up on right knee pain. Her pain is a 5-6 /10 on the pain scale. Patient has tried the following previous treatments bracing , anti-inflammatories, physical therapy , injections: Cortisone/Visco, and body part and date 04/10/2024. She mentions current symptoms of same as prior . She states that these treatments have stayed the same.    Subjective     Review of Systems: Review of Systems   Constitutional:  Negative for chills, fever, unexpected weight gain and unexpected weight loss.   HENT:  Negative for congestion, postnasal drip and rhinorrhea.    Eyes:  Negative for blurred vision.   Respiratory:  Negative for shortness of breath.    Cardiovascular:  Negative for leg swelling.   Gastrointestinal:  Negative for abdominal pain, nausea and vomiting.   Genitourinary:  Negative for difficulty urinating.   Musculoskeletal:  Positive for arthralgias. Negative for gait problem, joint swelling and myalgias.   Skin:  Negative for skin lesions and wound.   Neurological:  Negative for dizziness, weakness, light-headedness and numbness.   Hematological:  Does not bruise/bleed easily.   Psychiatric/Behavioral:  Negative for depressed mood.         Medications:   Current Outpatient Medications:   •  ASPIRIN 81 PO, aspirin, Disp: , Rfl:   •  atenolol (TENORMIN) 50 MG tablet, Take 1 tablet by mouth Daily., Disp: , Rfl:   •  benzonatate (TESSALON) 200 MG capsule, Take 1 capsule by mouth 3 (Three) Times a Day As Needed for Cough., Disp: 30  "capsule, Rfl: 0  •  Calcium Carbonate (Caltrate 600) 1500 (600 Ca) MG tablet, Take 1 tablet by mouth Daily., Disp: , Rfl:   •  diclofenac (VOLTAREN) 50 MG EC tablet, Take 1 tablet by mouth 2 (Two) Times a Day With Meals., Disp: 60 tablet, Rfl: 2  •  fluticasone (FLONASE) 50 MCG/ACT nasal spray, SHAKE LIQUID AND USE 2 SPRAYS IN EACH NOSTRIL DAILY FOR CONGESTION, Disp: , Rfl:   •  gabapentin (NEURONTIN) 300 MG capsule, TAKE 1 CAPSULE BY MOUTH THREE TIMES DAILY, Disp: 90 capsule, Rfl: 0  •  gemfibrozil (LOPID) 600 MG tablet, Take 1 tablet by mouth 2 (Two) Times a Day., Disp: , Rfl:   •  levothyroxine (SYNTHROID, LEVOTHROID) 100 MCG tablet, Take 1 tablet by mouth Daily., Disp: , Rfl:   •  losartan (COZAAR) 50 MG tablet, Take 1 tablet by mouth Daily., Disp: , Rfl:   •  NON FORMULARY, BI-EST E3E2 (8020) CREAM # 0.5MG/GM VAGINAL  [facility], Disp: , Rfl:   •  SUMAtriptan (IMITREX) 100 MG tablet, sumatriptan 100 mg tablet  take 1 po as needed for migraine, may repeat in 2 hours if needed, Disp: , Rfl:     Allergies:   Allergies   Allergen Reactions   • Doxycycline Hyclate Itching   • Quinolones Itching       I have reviewed and updated the patient's chief complaint, history of present illness, review of systems, past medical history, surgical history, family history, social history, medications and allergy list as appropriate.     Objective      Vital Signs:   Vitals:    08/12/24 1602   BP: 146/86   Weight: 58.1 kg (128 lb 1.6 oz)   Height: 161.5 cm (63.58\")     Body mass index is 22.28 kg/m².  BMI is within normal parameters. No other follow-up for BMI required.    Patient reports that she is a non-smoker and has not ever been a smoker.  This behavior was applauded and she was encouraged to continue in smoking cessation.  We will continue to monitor at subsequent visits.     Ortho Exam:  right knee exam:   knee contour shows mild swelling present.   Active range of motion 0° to 120°.   Passive range of " motion 0° to 130°.   Pseudolaxity with valgus stress testing.  Negative anterior or posterior laxity.   Negative Lachman exam.   negative tenderness to palpation at the medial joint line.   Positive tenderness at the lateral joint line.   mild tenderness over plica band at the medial knee.   Negative patella instability or crepitus.   Sensation grossly intact to the lower extremity and toes.   positive lateral Kelly's test.  normal gait.   no assistive device    Results Review:   Imaging Results (Last 24 Hours)       ** No results found for the last 24 hours. **            Procedures    Assessment / Plan      Assessment/Plan:   Diagnoses and all orders for this visit:    1. Acute lateral meniscus tear of right knee, subsequent encounter (Primary)  -     diclofenac (VOLTAREN) 50 MG EC tablet; Take 1 tablet by mouth 2 (Two) Times a Day With Meals.  Dispense: 60 tablet; Refill: 2    Follow-up on lateral meniscus tear of the right knee.  Continues to note pain and swelling especially with weightbearing activity.  She is not significantly worse compared to last visit but has not made any improvements either.  This despite a cortisone injection, knee brace, and physical therapy.  We prescribed diclofenac back on 5/29/2024 but she has not been taking this medication.  We will represcribe it today and discussed taking it up to twice per day.  She will come out of her knee brace.  She will be working only a maximum of 5 hours/day.  She will be applying ice to the knee.  I will see her back in 3 months to monitor her response.  At the follow-up visit, if she is not making improvements, we will discuss her surgical options or repeat cortisone injection.    Follow Up:   Return in about 3 months (around 11/12/2024) for Recheck.      Hernan Harris MD  [facility] [place] [facility]

## 2024-11-25 ENCOUNTER — OFFICE VISIT (OUTPATIENT)
Age: 69
End: 2024-11-25
Payer: OTHER MISCELLANEOUS

## 2024-11-25 VITALS
DIASTOLIC BLOOD PRESSURE: 72 MMHG | HEIGHT: 64 IN | SYSTOLIC BLOOD PRESSURE: 126 MMHG | WEIGHT: 132 LBS | BODY MASS INDEX: 22.53 KG/M2

## 2024-11-25 DIAGNOSIS — S83.281D ACUTE LATERAL MENISCUS TEAR OF RIGHT KNEE, SUBSEQUENT ENCOUNTER: Primary | ICD-10-CM

## 2024-11-25 DIAGNOSIS — M22.41 CHONDROMALACIA OF RIGHT PATELLA: ICD-10-CM

## 2024-11-25 PROCEDURE — 99213 OFFICE O/P EST LOW 20 MIN: CPT | Performed by: STUDENT IN AN ORGANIZED HEALTH CARE EDUCATION/TRAINING PROGRAM

## 2024-11-25 NOTE — PROGRESS NOTES
[facility] Office Follow Up Visit     Office Follow Up      Date: 11/25/2024   Patient Name: Jen Farias  MRN: [id number]  YOB: 1955    Referring Physician: Harinder Harris MD     Chief Complaint:   Chief Complaint   Patient presents with    Follow-up     3.5 month recheck - Acute lateral meniscus tear of right knee     History of Present Illness: Jen Farias is a 69 y.o. female who returns to clinic today for follow up on right knee pain. Her pain is a 5 /10 on the pain scale. Patient has tried the following previous treatments bracing , anti-inflammatories, physical therapy , and injections: Cortisone 4/10/24. She mentions current symptoms of pain , swelling, popping, and stiffness. She states that these treatments have worsened.    Subjective     Review of Systems: Review of Systems   Constitutional:  Negative for chills, fever, unexpected weight gain and unexpected weight loss.   HENT:  Negative for congestion, postnasal drip and rhinorrhea.    Eyes:  Negative for blurred vision.   Respiratory:  Negative for shortness of breath.    Cardiovascular:  Negative for leg swelling.   Gastrointestinal:  Negative for abdominal pain, nausea and vomiting.   Genitourinary:  Negative for difficulty urinating.   Musculoskeletal:  Positive for arthralgias. Negative for gait problem, joint swelling and myalgias.   Skin:  Negative for skin lesions and wound.   Neurological:  Negative for dizziness, weakness, light-headedness and numbness.   Hematological:  Does not bruise/bleed easily.   Psychiatric/Behavioral:  Negative for depressed mood.    All other systems reviewed and are negative.       Medications:   Current Outpatient Medications:     ASPIRIN 81 PO, aspirin, Disp: , Rfl:     atenolol (TENORMIN) 50 MG tablet, Take 1 tablet by mouth Daily., Disp: , Rfl:     benzonatate (TESSALON) 200 MG capsule, Take 1 capsule by mouth 3 (Three) Times a Day As  "Needed for Cough., Disp: 30 capsule, Rfl: 0    Calcium Carbonate (Caltrate 600) 1500 (600 Ca) MG tablet, Take 1 tablet by mouth Daily., Disp: , Rfl:     diclofenac (VOLTAREN) 50 MG EC tablet, Take 1 tablet by mouth 2 (Two) Times a Day With Meals., Disp: 60 tablet, Rfl: 2    fluticasone (FLONASE) 50 MCG/ACT nasal spray, SHAKE LIQUID AND USE 2 SPRAYS IN EACH NOSTRIL DAILY FOR CONGESTION, Disp: , Rfl:     gabapentin (NEURONTIN) 300 MG capsule, TAKE 1 CAPSULE BY MOUTH THREE TIMES DAILY, Disp: 90 capsule, Rfl: 0    gemfibrozil (LOPID) 600 MG tablet, Take 1 tablet by mouth 2 (Two) Times a Day., Disp: , Rfl:     levothyroxine (SYNTHROID, LEVOTHROID) 100 MCG tablet, Take 1 tablet by mouth Daily., Disp: , Rfl:     losartan (COZAAR) 50 MG tablet, Take 1 tablet by mouth Daily., Disp: , Rfl:     NON FORMULARY, BI-EST E3E2 (8020) CREAM # 0.5MG/GM VAGINAL  [facility], Disp: , Rfl:     SUMAtriptan (IMITREX) 100 MG tablet, sumatriptan 100 mg tablet  take 1 po as needed for migraine, may repeat in 2 hours if needed, Disp: , Rfl:     Allergies:   Allergies   Allergen Reactions    Doxycycline Hyclate Itching    Quinolones Itching       I have reviewed and updated the patient's chief complaint, history of present illness, review of systems, past medical history, surgical history, family history, social history, medications and allergy list as appropriate.     Objective      Vital Signs:   Vitals:    11/25/24 1301   BP: 126/72   Weight: 59.9 kg (132 lb)   Height: 161.5 cm (63.58\")     Body mass index is 22.96 kg/m².  BMI is within normal parameters. No other follow-up for BMI required.    Patient reports that she is a non-smoker and has not ever been a smoker.  This behavior was applauded and she was encouraged to continue in smoking cessation.  We will continue to monitor at subsequent visits.     Ortho Exam:  right knee exam:   knee contour shows mild swelling present.   Active range of motion 0° to 120°.   Passive " range of motion 0° to 130°.   Pseudolaxity with valgus stress testing.  Negative anterior or posterior laxity.   Negative Lachman exam.   negative tenderness to palpation at the medial joint line.   Positive tenderness at the lateral joint line.   mild tenderness over plica band at the medial knee.   Negative patella instability or crepitus.   Sensation grossly intact to the lower extremity and toes.   positive lateral Kelly's test.  normal gait.   no assistive device    Results Review:   Imaging Results (Last 24 Hours)       ** No results found for the last 24 hours. **            Procedures    Assessment / Plan      Assessment/Plan:   Diagnoses and all orders for this visit:    1. Acute lateral meniscus tear of right knee, subsequent encounter (Primary)    2. Chondromalacia of right patella    Follow-up on right knee pain.  Continues to have significant symptoms in the lateral and anterior knee.  Does have swelling and pain I do believe is arising from the lateral meniscus tear.  Does also have clear signs of impingement anteriorly and from the chondromalacia.  Has not progressed despite corticosteroid injection, diclofenac.  Has been in therapy.  Therefore today, we will make accommodations further for her to work with prolonged sitting and standing.  Will also refer her to Dr. Ferreira for surgical evaluation.  Follow-up with me as needed.    Follow Up:   Return for F/U with Jhon.      Hernan Harris MD  [facility]

## 2024-11-27 ENCOUNTER — OFFICE VISIT (OUTPATIENT)
Age: 69
End: 2024-11-27
Payer: OTHER MISCELLANEOUS

## 2024-11-27 VITALS
DIASTOLIC BLOOD PRESSURE: 90 MMHG | HEIGHT: 64 IN | WEIGHT: 132.06 LBS | SYSTOLIC BLOOD PRESSURE: 144 MMHG | BODY MASS INDEX: 22.55 KG/M2

## 2024-11-27 DIAGNOSIS — S83.281D ACUTE LATERAL MENISCUS TEAR OF RIGHT KNEE, SUBSEQUENT ENCOUNTER: Primary | ICD-10-CM

## 2024-11-27 DIAGNOSIS — Z02.6 ENCOUNTER RELATED TO WORKER'S COMPENSATION CLAIM: ICD-10-CM

## 2024-11-27 NOTE — PROGRESS NOTES
[facility]        Subjective     Pain of the Right Knee      HPI    Jen Farias is a 69 y.o. female.  She is a new patient to me.  She was referred to me for surgical consultation for lateral meniscus tear of the right knee.  She is a Worker's Comp. patient.  She injured her knee with a fall in 2023.  She is currently on work restrictions of 5 hours a day and 20 hours a week.  Pain is 5 out of 10.  She has tried a knee brace anti-inflammatories physical therapy and injections.  Her last cortisone injection was April 10.  She had an MRI in February which showed a lateral meniscus tear.  She had no problems with her knee prior to the fall.  She has pain and mechanical symptoms.  She has swelling popping and stiffness.    Past Medical History:   Diagnosis Date    Hypertension     Low back pain     Sciatica       No past surgical history on file.   Family History   Problem Relation Age of Onset    Cancer Mother     Hypertension Father      Social History     Socioeconomic History    Marital status:    Tobacco Use    Smoking status: Never    Smokeless tobacco: Never   Vaping Use    Vaping status: Never Used   Substance and Sexual Activity    Alcohol use: Never    Drug use: Never    Sexual activity: Defer      Current Outpatient Medications on File Prior to Visit   Medication Sig Dispense Refill    ASPIRIN 81 PO aspirin      atenolol (TENORMIN) 50 MG tablet Take 1 tablet by mouth Daily.      benzonatate (TESSALON) 200 MG capsule Take 1 capsule by mouth 3 (Three) Times a Day As Needed for Cough. 30 capsule 0    Calcium Carbonate (Caltrate 600) 1500 (600 Ca) MG tablet Take 1 tablet by mouth Daily.      diclofenac (VOLTAREN) 50 MG EC tablet Take 1 tablet by mouth 2 (Two) Times a Day With Meals. 60 tablet 2    Estradiol-Estriol-Progesterone (BI-EST 80:20 PROGESTERONE TD) APPLY 0.5 GRAMS INTRAVAGINALLY 3 TIMES A WEEK AT BEDTIME      fluticasone (FLONASE) 50 MCG/ACT nasal spray SHAKE  "LIQUID AND USE 2 SPRAYS IN EACH NOSTRIL DAILY FOR CONGESTION      gabapentin (NEURONTIN) 300 MG capsule TAKE 1 CAPSULE BY MOUTH THREE TIMES DAILY 90 capsule 0    gemfibrozil (LOPID) 600 MG tablet Take 1 tablet by mouth 2 (Two) Times a Day.      levothyroxine (SYNTHROID, LEVOTHROID) 100 MCG tablet Take 1 tablet by mouth Daily.      losartan (COZAAR) 50 MG tablet Take 1 tablet by mouth Daily.      SUMAtriptan (IMITREX) 100 MG tablet sumatriptan 100 mg tablet   take 1 po as needed for migraine, may repeat in 2 hours if needed      [DISCONTINUED] NON FORMULARY BI-EST E3E2 (6620) CREAM # 0.5MG/GM VAGINAL    [facility]       No current facility-administered medications on file prior to visit.      Allergies   Allergen Reactions    Doxycycline Hyclate Itching    Quinolones Itching          Review of Systems   All other systems reviewed and are negative.       I reviewed the patient's chief complaint, history of present illness, review of systems, past medical history, surgical history, family history, social history, medications and allergy list.        Objective      Physical Exam  /90   Ht 161.5 cm (63.58\")   Wt 59.9 kg (132 lb 0.9 oz)   BMI 22.97 kg/m²     Body mass index is 22.97 kg/m².    General  Mental Status - alert  General Appearance - cooperative, well groomed, not in acute distress  Orientation - Oriented X3  Build & Nutrition - well developed and well nourished  Posture - normal posture  Gait - normal gait       Ortho Exam  Right knee is tender lateral joint menopause Kelly.  No swelling no effusion.  Stable ligament exam.  Full range of motion.  Negative straight leg raise.    Imaging/Studies Reviewed and Interpreted:  Imaging Results (Last 24 Hours)       ** No results found for the last 24 hours. **          I viewed and personally interpreted her right knee MRI from February 15, 2024 which shows a large lateral meniscus tear  I viewed her knee radiographs from March 20, 2024 " which show no joint space narrowing and no signs of arthritis  Assessment    Assessment:  1. Acute lateral meniscus tear of right knee, subsequent encounter    2. Encounter related to worker's compensation claim        Plan:  Plan is for right knee arthroscopy partial lateral meniscectomy.  She has failed all conservative management to include time injections physical therapy NSAIDs and bracing.  She has mechanical symptoms.  Surgery is clearly indicated.  She has no signs of arthritis and no contraindication to surgery.    Treatment options and alternatives were discussed with patient.  Surgical risks include but are not limited to pain, bleeding, infection, failure to relieve symptoms, need for further procedures, recurrence of symptoms, damage to healthy adjacent structures, hardware loosening/failure, stiffness, weakness, scar, blood clots/DVT/PE, loss of limb or life. We also discussed the postoperative protocol and expected outcome although no guarantees are possible with surgery. All questions were answered; the patient would like to proceed with surgical intervention.  She will continue restrictions of limitations of working 5 hours a day and 20 hours a week.        Howie Ferreira MD  11/27/24  11:12 EST      Dictated Utilizing Dragon Dictation.

## 2025-03-27 ENCOUNTER — TELEPHONE (OUTPATIENT)
Dept: ORTHOPEDIC SURGERY | Facility: CLINIC | Age: 70
End: 2025-03-27
Payer: OTHER MISCELLANEOUS

## 2025-03-27 NOTE — TELEPHONE ENCOUNTER
Called patient a left a message we received the work comp auth and she can call me back to schedule knee surgery [phone]

## 2025-04-09 ENCOUNTER — TELEPHONE (OUTPATIENT)
Age: 70
End: 2025-04-09
Payer: OTHER MISCELLANEOUS

## 2025-04-09 NOTE — TELEPHONE ENCOUNTER
I called patient to let her know that we have received an approval from workers comp for her surgery with Dr. Ferreira, and we can get her scheduled. She did not answer at thi time I left a voicemail with this information and asked her to give our office a call back at there convenience. Yaritza Hsu CMA

## 2025-05-06 ENCOUNTER — OUTSIDE FACILITY SERVICE (OUTPATIENT)
Dept: ORTHOPEDIC SURGERY | Facility: CLINIC | Age: 70
End: 2025-05-06

## 2025-05-06 DIAGNOSIS — Z87.828 S/P ARTHROSCOPIC PARTIAL LATERAL MENISCECTOMY OF RIGHT KNEE: Primary | ICD-10-CM

## 2025-05-06 DIAGNOSIS — Z98.890 S/P ARTHROSCOPIC PARTIAL LATERAL MENISCECTOMY OF RIGHT KNEE: Primary | ICD-10-CM

## 2025-05-06 RX ORDER — HYDROCODONE BITARTRATE AND ACETAMINOPHEN 5; 325 MG/1; MG/1
1 TABLET ORAL EVERY 6 HOURS PRN
Qty: 12 TABLET | Refills: 0 | Status: SHIPPED | OUTPATIENT
Start: 2025-05-06

## 2025-05-06 RX ORDER — ONDANSETRON 4 MG/1
4 TABLET, FILM COATED ORAL EVERY 8 HOURS PRN
Qty: 10 TABLET | Refills: 1 | Status: SHIPPED | OUTPATIENT
Start: 2025-05-06

## 2025-05-09 ENCOUNTER — TELEPHONE (OUTPATIENT)
Dept: ORTHOPEDIC SURGERY | Facility: CLINIC | Age: 70
End: 2025-05-09
Payer: OTHER MISCELLANEOUS

## 2025-05-09 NOTE — TELEPHONE ENCOUNTER
Caller: Jen Farias    Relationship: Self    Best call back number: [phone]    What form or medical record are you requesting: WORK NOTE    Who is requesting this form or medical record from you:       FAX#- [phone]      Timeframe paperwork needed: ASAP    Additional notes: SPOKE WITH PT- SHE STATES THAT SHE WOULD LIKE TO HAVE THE EXACT SAME WORK NOTE THAT WAS FAXED ON 5.9.25- SENT TO HER     SEE PREVIOUS MESSAGE    NEEDS WORK NOTE SENT TO [email or web address] OFF WORK UNTIL 5/14/25- HER NEXT APPT WITH DR ROSAS-      PLEASE ADVISE ONCE COMPLETE

## 2025-05-09 NOTE — TELEPHONE ENCOUNTER
Caller: ERICA    Relationship: SELF    Best call back number: [phone]    What form or medical record are you requesting: NEEDS WORK NOTE SENT TO [email or web address] OFF WORK UNTIL 5/14/25- HER NEXT APPT WITH DR ROSAS- PLEASE CALL

## 2025-05-09 NOTE — TELEPHONE ENCOUNTER
Called patient and faxed work letter to: [phone] per patient request.    I also emailed her the link to sign up for [employer].    Chelsea Read MA

## 2025-05-09 NOTE — LETTER
May 9, 2025     Patient: Jen Farias   YOB: 1955   Date of Visit: 5/6/2025       To Whom It May Concern:    It is my medical opinion that Jen Farias should remain out of work until her next post-op visit on 5/14/25 .           Sincerely,        Howie Ferreira MD    CC: No Recipients

## 2025-05-14 ENCOUNTER — OFFICE VISIT (OUTPATIENT)
Age: 70
End: 2025-05-14
Payer: OTHER MISCELLANEOUS

## 2025-05-14 VITALS — TEMPERATURE: 98.2 F

## 2025-05-14 DIAGNOSIS — Z02.6 ENCOUNTER RELATED TO WORKER'S COMPENSATION CLAIM: ICD-10-CM

## 2025-05-14 DIAGNOSIS — Z98.890 S/P ARTHROSCOPIC PARTIAL LATERAL MENISCECTOMY OF RIGHT KNEE: Primary | ICD-10-CM

## 2025-05-14 DIAGNOSIS — Z87.828 S/P ARTHROSCOPIC PARTIAL LATERAL MENISCECTOMY OF RIGHT KNEE: Primary | ICD-10-CM

## 2025-05-14 NOTE — PROGRESS NOTES
Chief Complaint   Patient presents with    Post-op     1 week S/P arthroscopic partial lateral meniscectomy of right knee 5/6/25           HPI    She is follow-up right knee arthroscopy with partial lateral meniscectomy.  She is having pain she is on crutches.  She has not yet started physical therapy     Vitals:    05/14/25 1147   Temp: 98.2 °F (36.8 °C)         Physical Exam:  Portals look good with no sign of infection  right knee looks good.  Range of motion 0-60.  She is stiff she has a negative Homans' sign and she is on crutches.          ICD-10-CM ICD-9-CM   1. S/P arthroscopic partial lateral meniscectomy of right knee  Z98.890 V45.89    Z87.828 V15.59   2. Encounter related to worker's compensation claim  Z02.6 V70.3       Orders Placed This Encounter   Procedures    Ambulatory Referral to Physical Therapy for Evaluation & Treatment       She will start physical therapy.  Work restrictions are seated job only or off work.  She is still on crutches and needs crutches for work as well.  If that cannot be accommodated she is off work.  She will follow-up in 3 weeks.      Howie Ferreira M.D., [facility]  Orthopedic Surgeon  Fellowship Trained Sports Medicine  [facility]  Orthopedics and Sports Medicine  [address] [place] [address], [address]  [place] [address]      EMR Dragon/Transcription disclaimer:  Please note that portions of this document were completed with a voice recognition program.      At [facility], we believe that sharing information builds trust and better relationships. You are receiving this note because you are receiving care at [facility] or have recently visited. It is possible you will see health information before a provider has talked with you about it. This kind of information can be easy to misunderstand as it is a medical document. It is intended as inhv-rz-vnrr communication. It is written in medical language and may contain abbreviations or verbiage that are  unfamiliar. It may appear blunt or direct. Medical documents are intended to carry relevant information, facts as evident, and the clinical opinion of the practitioner.  To help you fully understand what it means for your health, we urge you to discuss this note with your provider.

## 2025-05-27 NOTE — TELEPHONE ENCOUNTER
PATIENT IS REQUESTING MORE DETAILED WORK RESTRICTIONS. HER WORK IS WANTING SOMETHING MORE SPECIFIC THAN 'SEATED JOB ONLY'. SHE WORKS IN A [employer] WHERE SHE IS A . FOR THIS REASON SHE HAS BEEN UNABLE TO RETURN TO WORK.     IF CLINICAL STAFF COULD PLEASE ADVISE.     CALL BACK # [phone]     PLEASE FAX NOTE TO HR @  [phone]

## 2025-06-04 ENCOUNTER — OFFICE VISIT (OUTPATIENT)
Age: 70
End: 2025-06-04
Payer: OTHER MISCELLANEOUS

## 2025-06-04 DIAGNOSIS — Z87.828 S/P ARTHROSCOPIC PARTIAL LATERAL MENISCECTOMY OF RIGHT KNEE: Primary | ICD-10-CM

## 2025-06-04 DIAGNOSIS — Z02.6 ENCOUNTER RELATED TO WORKER'S COMPENSATION CLAIM: ICD-10-CM

## 2025-06-04 DIAGNOSIS — Z98.890 S/P ARTHROSCOPIC PARTIAL LATERAL MENISCECTOMY OF RIGHT KNEE: Primary | ICD-10-CM

## 2025-06-04 NOTE — PROGRESS NOTES
Chief Complaint   Patient presents with    Post-op     3 week recheck- S/P arthroscopic partial lateral meniscectomy of right knee 5/6/25           HPI    She is follow-up right knee arthroscopy partial lateral resection.  She stopped going to PT because of pain.  She says she cannot go to work because of pain.  She is still using crutches.    There were no vitals filed for this visit.      Physical Exam:  Her pain is inconsistent with her exam.  Right knee portals look perfect.  No swelling no effusion.  Tender medial and lateral.  Range of motion 0-60.  Limited by pain.        ICD-10-CM ICD-9-CM   1. S/P arthroscopic partial lateral meniscectomy of right knee  Z98.890 V45.89    Z87.828 V15.59   2. Encounter related to worker's compensation claim  Z02.6 V70.3     She needs to continue physical therapy to get better.  She stopped going.  She is adamant that she cannot work.  There might be a communication issue.  I explained restrictions are seated job only or off work.  I cannot write off work as I need to write what the restrictions actually are.  I encouraged her to do physical therapy hopefully she will do that if she does not do the physical therapy she will not get better.  She will follow-up again in 4 weeks.      Howie Ferreira M.D., FAAOS  Orthopedic Surgeon  Fellowship Trained Sports Medicine  [facility]  Orthopedics and Sports Medicine  [address] [place] [address], [address]  [place] [address]      EMR Dragon/Transcription disclaimer:  Please note that portions of this document were completed with a voice recognition program.      At [facility], we believe that sharing information builds trust and better relationships. You are receiving this note because you are receiving care at [facility] or have recently visited. It is possible you will see health information before a provider has talked with you about it. This kind of information can be easy to misunderstand as it is a medical  document. It is intended as kwla-ee-vtej communication. It is written in medical language and may contain abbreviations or verbiage that are unfamiliar. It may appear blunt or direct. Medical documents are intended to carry relevant information, facts as evident, and the clinical opinion of the practitioner.  To help you fully understand what it means for your health, we urge you to discuss this note with your provider.

## 2025-06-06 ENCOUNTER — TELEPHONE (OUTPATIENT)
Dept: ORTHOPEDIC SURGERY | Facility: CLINIC | Age: 70
End: 2025-06-06
Payer: OTHER MISCELLANEOUS

## 2025-06-06 NOTE — TELEPHONE ENCOUNTER
Called patient back and explained that we are not able to write for her to be off completely due to this being related to workers compensation. I explained that if she is unable to do seated job only, that her employer will have to take her off work based on the note that we wrote on 06/04/2025. She understood. She will call back with any further problems or questions.     Coleen

## 2025-07-09 ENCOUNTER — OFFICE VISIT (OUTPATIENT)
Age: 70
End: 2025-07-09
Payer: OTHER MISCELLANEOUS

## 2025-07-09 DIAGNOSIS — Z87.828 S/P ARTHROSCOPIC PARTIAL LATERAL MENISCECTOMY OF RIGHT KNEE: Primary | ICD-10-CM

## 2025-07-09 DIAGNOSIS — Z98.890 S/P ARTHROSCOPIC PARTIAL LATERAL MENISCECTOMY OF RIGHT KNEE: Primary | ICD-10-CM

## 2025-07-09 DIAGNOSIS — Z02.6 ENCOUNTER RELATED TO WORKER'S COMPENSATION CLAIM: ICD-10-CM

## 2025-07-09 RX ORDER — SOLIFENACIN SUCCINATE 5 MG/1
TABLET, FILM COATED ORAL
COMMUNITY
Start: 2025-07-08

## 2025-07-09 NOTE — PROGRESS NOTES
Chief Complaint   Patient presents with    Post-op     1 month f/u-- 2 months S/P arthroscopic partial lateral meniscectomy of right knee 5/6/25           HPI    She is doing better.  She has been slow going from her knee scope knee 6.  [facility]    There were no vitals filed for this visit.      Physical Exam:    She is walking normally today.  Range of motion 0-1 20.  Strength is returning to normal.  No swelling no effusion.  She has tape line from physical therapy from yesterday.        ICD-10-CM ICD-9-CM   1. S/P arthroscopic partial lateral meniscectomy of right knee  Z98.890 V45.89    Z87.828 V15.59   2. Encounter related to worker's compensation claim  Z02.6 V70.3       Orders Placed This Encounter   Procedures    Ambulatory Referral to Physical Therapy for Evaluation & Treatment     She is doing better.  She has turned the corner.  She will follow-up in 1 month.  Continue restrictions seated job only or off work until follow-up.  Anticipate return to work full duty when I see her next month.        Howie Ferreira M.D., FAAOS  Orthopedic Surgeon  Fellowship Trained Sports Medicine  [facility]  Orthopedics and Sports Medicine  [address] [place] [address], [address]  [place] [address]      EMR Dragon/Transcription disclaimer:  Please note that portions of this document were completed with a voice recognition program.      At [facility], we believe that sharing information builds trust and better relationships. You are receiving this note because you are receiving care at [facility] or have recently visited. It is possible you will see health information before a provider has talked with you about it. This kind of information can be easy to misunderstand as it is a medical document. It is intended as mpzc-yk-ddjs communication. It is written in medical language and may contain abbreviations or verbiage that are unfamiliar. It may appear blunt or direct.  Medical documents are intended to carry relevant information, facts as evident, and the clinical opinion of the practitioner.  To help you fully understand what it means for your health, we urge you to discuss this note with your provider.

## 2025-08-06 ENCOUNTER — OFFICE VISIT (OUTPATIENT)
Age: 70
End: 2025-08-06
Payer: OTHER MISCELLANEOUS

## 2025-08-06 DIAGNOSIS — Z98.890 S/P ARTHROSCOPIC PARTIAL LATERAL MENISCECTOMY OF RIGHT KNEE: Primary | ICD-10-CM

## 2025-08-06 DIAGNOSIS — Z87.828 S/P ARTHROSCOPIC PARTIAL LATERAL MENISCECTOMY OF RIGHT KNEE: Primary | ICD-10-CM
